# Patient Record
Sex: FEMALE | Race: NATIVE HAWAIIAN OR OTHER PACIFIC ISLANDER | Employment: UNEMPLOYED | URBAN - METROPOLITAN AREA
[De-identification: names, ages, dates, MRNs, and addresses within clinical notes are randomized per-mention and may not be internally consistent; named-entity substitution may affect disease eponyms.]

---

## 2022-01-08 ENCOUNTER — HOSPITAL ENCOUNTER (OUTPATIENT)
Age: 27
Discharge: HOME | End: 2022-01-08
Payer: COMMERCIAL

## 2022-01-08 VITALS
RESPIRATION RATE: 18 BRPM | SYSTOLIC BLOOD PRESSURE: 101 MMHG | TEMPERATURE: 97 F | DIASTOLIC BLOOD PRESSURE: 69 MMHG | HEART RATE: 93 BPM | OXYGEN SATURATION: 100 %

## 2022-01-08 DIAGNOSIS — Z20.822 ENCOUNTER FOR LABORATORY TESTING FOR COVID-19 VIRUS: Primary | ICD-10-CM

## 2022-01-08 DIAGNOSIS — U07.1 COVID: ICD-10-CM

## 2022-01-08 LAB
S PYO AG THROAT QL: NEGATIVE
SARS-COV-2 RNA RESP QL NAA+PROBE: DETECTED

## 2022-01-08 PROCEDURE — 99213 OFFICE O/P EST LOW 20 MIN: CPT | Performed by: NURSE PRACTITIONER

## 2022-01-08 PROCEDURE — U0002 COVID-19 LAB TEST NON-CDC: HCPCS | Performed by: NURSE PRACTITIONER

## 2022-01-08 PROCEDURE — 87880 STREP A ASSAY W/OPTIC: CPT | Performed by: NURSE PRACTITIONER

## 2022-01-08 RX ORDER — IBUPROFEN 600 MG/1
600 TABLET ORAL EVERY 8 HOURS PRN
Qty: 30 TABLET | Refills: 0 | Status: SHIPPED | OUTPATIENT
Start: 2022-01-08 | End: 2022-01-15

## 2022-01-08 NOTE — ED PROVIDER NOTES
Patient Seen in: [facility]      History   Patient presents with:  Fever  Sore Throat  Headache    Stated Complaint: SORE , FEVER X 1 DAY    Subjective:   HPI    33 Yo arrives to the ic with co sore throat, tolerating secretions, phonation no midline. Posterior oropharyngeal erythema present. No oropharyngeal exudate or uvula swelling. Eyes:      Conjunctiva/sclera: Conjunctivae normal.      Pupils: Pupils are equal, round, and reactive to light.    Pulmonary:      Effort: Pulmonary effort is additional evaluation. If completed for this patient, all laboratory and radiology diagnostic results were interpreted by myself. HPI obtained with pt as primary historian.        Physical exam remained stable over serial reexaminations as previously doc for COVID-19 virus  (primary encounter diagnosis)  COVID     Disposition:  Discharge  1/8/2022  3:45 pm    Follow-up:  No follow-up provider specified.         Medications Prescribed:  Discharge Medication List as of 1/8/2022  3:46 PM    START taking these

## 2022-01-08 NOTE — ED INITIAL ASSESSMENT (HPI)
Pt states she's been having a sore throat, headache, and fever since yesterday.  tested + for COVID.

## 2022-08-01 ENCOUNTER — HOSPITAL ENCOUNTER (OUTPATIENT)
Age: 27
Discharge: HOME | End: 2022-08-01
Payer: COMMERCIAL

## 2022-08-01 VITALS
OXYGEN SATURATION: 98 % | HEART RATE: 76 BPM | DIASTOLIC BLOOD PRESSURE: 63 MMHG | RESPIRATION RATE: 18 BRPM | TEMPERATURE: 98 F | SYSTOLIC BLOOD PRESSURE: 99 MMHG

## 2022-08-01 DIAGNOSIS — U07.1 COVID-19: Primary | ICD-10-CM

## 2022-08-01 LAB — SARS-COV-2 RNA RESP QL NAA+PROBE: DETECTED

## 2022-08-01 PROCEDURE — 99213 OFFICE O/P EST LOW 20 MIN: CPT | Performed by: NURSE PRACTITIONER

## 2022-08-01 PROCEDURE — U0002 COVID-19 LAB TEST NON-CDC: HCPCS | Performed by: NURSE PRACTITIONER

## 2022-08-01 RX ORDER — BENZONATATE 100 MG/1
200 CAPSULE ORAL 3 TIMES DAILY PRN
Qty: 30 CAPSULE | Refills: 0 | Status: SHIPPED | OUTPATIENT
Start: 2022-08-01 | End: 2022-08-31

## 2022-08-01 RX ORDER — ALBUTEROL SULFATE 90 UG/1
2 AEROSOL, METERED RESPIRATORY (INHALATION) EVERY 4 HOURS PRN
Qty: 1 EACH | Refills: 0 | Status: SHIPPED | OUTPATIENT
Start: 2022-08-01 | End: 2022-08-31

## 2022-08-02 NOTE — ED INITIAL ASSESSMENT (HPI)
Patient presents a&o 4/4 with c/o lingering cough from being sick ~ 1 week ago.  Also c/o lingering sore throat and congestion

## 2025-01-07 ENCOUNTER — HOSPITAL ENCOUNTER (OUTPATIENT)
Age: 30
Discharge: HOME | End: 2025-01-07
Payer: COMMERCIAL

## 2025-01-07 ENCOUNTER — APPOINTMENT (OUTPATIENT)
Dept: GENERAL RADIOLOGY | Age: 30
End: 2025-01-07
Attending: NURSE PRACTITIONER
Payer: COMMERCIAL

## 2025-01-07 VITALS
DIASTOLIC BLOOD PRESSURE: 73 MMHG | TEMPERATURE: 99 F | RESPIRATION RATE: 12 BRPM | HEART RATE: 76 BPM | OXYGEN SATURATION: 100 % | SYSTOLIC BLOOD PRESSURE: 118 MMHG

## 2025-01-07 DIAGNOSIS — S20.212A RIB CONTUSION, LEFT, INITIAL ENCOUNTER: ICD-10-CM
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
## 2025-01-07 DIAGNOSIS — S50.02XA CONTUSION OF LEFT ELBOW, INITIAL ENCOUNTER: ICD-10-CM

## 2025-01-07 PROCEDURE — 73080 X-RAY EXAM OF ELBOW: CPT | Performed by: NURSE PRACTITIONER

## 2025-01-07 PROCEDURE — 99214 OFFICE O/P EST MOD 30 MIN: CPT | Performed by: NURSE PRACTITIONER

## 2025-01-07 PROCEDURE — 71101 X-RAY EXAM UNILAT RIBS/CHEST: CPT | Performed by: NURSE PRACTITIONER

## 2025-01-07 NOTE — ED PROVIDER NOTES
Patient Seen in: [facility]      History     Chief Complaint   Patient presents with    Fall    Elbow Injury     Stated Complaint: elbow pain/fall  Subjective:   29-year-old female with no past medical history presents from home.  Patient states she had a fall yesterday.  She was in the parking lot at work and slipped and fell on the ice.  The injury occurred around 8:30 in the morning.  Fell towards her left side, landing on left elbow.  Thinks she may have hit her head but denies loss of consciousness.  She is not taking any blood thinning medications.  Complaining of left elbow pain.  Also started to notice some left rib pain today.  States it hurts to touch but not take a deep breath.  No shortness of breath.  She has taken ibuprofen for pain.  She is right-hand dominant.    The history is provided by the patient. No  was used.     Objective:   History reviewed. No pertinent past medical history.         History reviewed. No pertinent surgical history.           Social History     Socioeconomic History    Marital status:    Tobacco Use    Smoking status: Never    Smokeless tobacco: Never     Social Drivers of Health      [facility]            Review of Systems    Positive for stated complaint: Fall and Elbow Injury    Other systems are as noted in HPI.  Constitutional and vital signs reviewed.      All other systems reviewed and negative except as noted above.    Physical Exam     ED Triage Vitals [01/07/25 1158]   /73   Pulse 76   Resp 12   Temp 98.5 °F (36.9 °C)   Temp src Oral   SpO2 100 %   O2 Device None (Room air)     Current:/73   Pulse 76   Temp 98.5 °F (36.9 °C) (Oral)   Resp 12   LMP 01/03/2025   SpO2 100%     Physical Exam  Vitals and nursing note reviewed.   Constitutional:       General: She is not in acute distress.     Appearance: Normal appearance. She is not ill-appearing or toxic-appearing.   HENT:      Head:  Normocephalic and atraumatic.      Comments: No scalp tenderness or signs of trauma     Nose: Nose normal.      Mouth/Throat:      Mouth: Mucous membranes are moist.      Pharynx: Oropharynx is clear.   Eyes:      Pupils: Pupils are equal, round, and reactive to light.   Cardiovascular:      Rate and Rhythm: Normal rate and regular rhythm.      Pulses: Normal pulses.   Pulmonary:      Effort: Pulmonary effort is normal. No respiratory distress.      Breath sounds: Normal breath sounds.      Comments: Lungs clear.  No adventitious lung sounds.  No distress.  No hypoxia.  Pulse ox 100% ra. Which is normal    Chest:      Chest wall: Tenderness present.       Abdominal:      General: Abdomen is flat.      Palpations: Abdomen is soft.   Musculoskeletal:         General: No signs of injury. Normal range of motion.      Left elbow: No swelling. Normal range of motion. Tenderness present in olecranon process.      Cervical back: Normal range of motion and neck supple. No bony tenderness. No pain with movement or spinous process tenderness. Normal range of motion.      Thoracic back: No bony tenderness.      Lumbar back: No bony tenderness.   Skin:     General: Skin is warm and dry.      Capillary Refill: Capillary refill takes less than 2 seconds.   Neurological:      General: No focal deficit present.      Mental Status: She is alert and oriented to person, place, and time.      GCS: GCS eye subscore is 4. GCS verbal subscore is 5. GCS motor subscore is 6.   Psychiatric:         Mood and Affect: Mood normal.         Behavior: Behavior normal.         Thought Content: Thought content normal.         Judgment: Judgment normal.         ED Course   Radiology:  XR ELBOW, COMPLETE (MIN 3 VIEWS), LEFT (CPT=73080)    Result Date: 1/7/2025  CONCLUSION: Normal examination.     Dictated by (CST): Diaz Majano MD on 1/07/2025 at 1:22 PM     Finalized by (CST): Diaz Majano MD on 1/07/2025 at 1:22 PM          XR RIBS WITH CHEST (3  VIEWS), LEFT  (CPT=71101)    Result Date: 1/7/2025  CONCLUSION:  1.  No acute cardiopulmonary abnormality. 2.  No acute left rib fracture or deformity.    Dictated by (CST): Ulysses Feliz MD on 1/07/2025 at 1:11 PM     Finalized by (CST): Ulysses Feliz MD on 1/07/2025 at 1:12 PM         Labs Reviewed - No data to display  [place] Head CT Rule  CT head required with mild TBI and any one of the following  GCS <15 2 hours after injury  Suspected open or depressed skull fracture  Suspected basilar skull fracture  2 or more episodes of vomiting  65 years of age or older  Amnesia  Dangerous mechanism of injury  Neurologic deficit  Seizure  Anticoagulated  Return visit for TBI    MDM     Medical Decision Making  Differential diagnoses reflecting the complexity of care include: Fall on ice, left elbow fracture versus contusion, left rib fracture versus contusion  Fall on ice.  Landed on left elbow.  Complaining of left elbow and left rib pain.    Thinks she may have hit her head, states she does not recall but states no loss of consciousness.  No headache.  GCS 15.  No indication for CT brain per [place] head CT rule.  Patient agreeable  X-ray of the left elbow is negative for acute fracture or dislocation.  Very low suspicion for occult fracture.  Full range of motion of the joint, minimal tenderness.  There is a very small linear abrasion, no wound to repair.  Left rib series x-ray negative for fracture.  No pneumothorax.  Minimal tenderness.  Most likely contusion.    Plan of Care: Ibuprofen.  Ice.  Follow-up with occupational health if no improvement.  Discussed the possibility of occult fracture.  Low suspicion at this time.  Given note to be off of work tomorrow    Results and plan of care discussed with the patient/family. They are in agreement with discharge. They understand to follow up with their primary doctor or the referral physician for further evaluation, especially if no improvement.  Also discussed the  limitations of immediate care, patient is aware that if symptoms are worse they should go to the emergency room. Verbal and written discharge instructions were given.     My independent interpretation of studies of: Left elbow x-ray negative for fracture  Diagnostic tests and medications considered but not ordered were: CT brain, no indication for [place] CT head rule  Shared decision making was done by: Patient agreeable to hold off on CT brain  Comorbidities that add complexity to management include: None  External chart review was done and was noted: Reviewed, noncontributory  History obtained by an independent source was from: N/A   Discussions and management was done with: N/A  Social determinants of health that affect care: Fall occurred at the parking lot at work              Problems Addressed:  Contusion of left elbow, initial encounter: acute illness or injury  Fall due to slipping on ice or snow, initial encounter: acute illness or injury  Rib contusion, left, initial encounter: acute illness or injury    Amount and/or Complexity of Data Reviewed  Radiology: ordered and independent interpretation performed. Decision-making details documented in ED Course.    Risk  OTC drugs.        Disposition and Plan     Clinical Impression:  1. Fall due to slipping on ice or snow, initial encounter    2. Contusion of left elbow, initial encounter    3. Rib contusion, left, initial encounter         Disposition:  Discharge  1/7/2025  1:28 pm    Follow-up:  [facility] in [address] [address]  [phone]              Medications Prescribed:  There are no discharge medications for this patient.

## 2025-01-07 NOTE — DISCHARGE INSTRUCTIONS
No fracture noted on the xrays. Take 2 tablets of ibuprofen every 6 hours as needed for pain.  Apply ice to the sore areas.  Follow-up with occupational health for any further concerns

## (undated) NOTE — LETTER
Date & Time: 1/7/2025, 1:31 PM  Patient: Loretta Zhu  Encounter Provider(s):    Jasmin Gordon APRN       To Whom It May Concern:    Loretta Zhu was seen and treated in our department on 1/7/2025. She should not return to work until 1/9/25 .    If you have any questions or concerns, please do not hesitate to call.        _____________________________  Physician/APC Signature